# Patient Record
Sex: MALE | Race: BLACK OR AFRICAN AMERICAN | NOT HISPANIC OR LATINO | URBAN - METROPOLITAN AREA
[De-identification: names, ages, dates, MRNs, and addresses within clinical notes are randomized per-mention and may not be internally consistent; named-entity substitution may affect disease eponyms.]

---

## 2023-11-06 ENCOUNTER — EMERGENCY (EMERGENCY)
Facility: HOSPITAL | Age: 28
LOS: 0 days | Discharge: HOME | End: 2023-11-06
Payer: MEDICAID

## 2023-11-06 VITALS
HEIGHT: 69 IN | WEIGHT: 139.99 LBS | OXYGEN SATURATION: 96 % | RESPIRATION RATE: 16 BRPM | TEMPERATURE: 98 F | HEART RATE: 94 BPM | DIASTOLIC BLOOD PRESSURE: 90 MMHG | SYSTOLIC BLOOD PRESSURE: 127 MMHG

## 2023-11-06 VITALS
DIASTOLIC BLOOD PRESSURE: 98 MMHG | OXYGEN SATURATION: 98 % | RESPIRATION RATE: 17 BRPM | HEART RATE: 84 BPM | SYSTOLIC BLOOD PRESSURE: 131 MMHG
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
## 2023-11-06 DIAGNOSIS — M54.50 LOW BACK PAIN, UNSPECIFIED: ICD-10-CM
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
## 2023-11-06 DIAGNOSIS — M54.9 DORSALGIA, UNSPECIFIED: ICD-10-CM

## 2023-11-06 PROCEDURE — 99284 EMERGENCY DEPT VISIT MOD MDM: CPT

## 2023-11-06 RX ORDER — IBUPROFEN 200 MG
1 TABLET ORAL
Qty: 20 | Refills: 0
Start: 2023-11-06 | End: 2023-11-10

## 2023-11-06 RX ORDER — METHOCARBAMOL 500 MG/1
2 TABLET, FILM COATED ORAL
Qty: 30 | Refills: 0
Start: 2023-11-06 | End: 2023-11-10

## 2023-11-06 NOTE — ED PROVIDER NOTE - CARE PROVIDER_API CALL
your pmd in 1-3 days,   Phone: (   )    -  Fax: (   )    -  Follow Up Time:     Ann Penn  Orthopaedic Surgery  [facility], [address] [address]  Phone: [phone]  Fax: [phone]  Follow Up Time: 1-3 Days

## 2023-11-06 NOTE — ED PROVIDER NOTE - PROVIDER TOKENS
FREE:[LAST:[your pmd in 1-3 days],PHONE:[(   )    -],FAX:[(   )    -]],PROVIDER:[TOKEN:[[id number]],FOLLOWUP:[1-3 Days]]

## 2023-11-06 NOTE — ED ADULT TRIAGE NOTE - CHIEF COMPLAINT QUOTE
Pt AAO x 3 ambulatory with steady gait c/o lower back pain x 3 days ago denies injury or trauma or urinary changes. pt states he was in bed and twisted and since then he has pain

## 2023-11-06 NOTE — ED PROVIDER NOTE - CLINICAL SUMMARY MEDICAL DECISION MAKING FREE TEXT BOX
+backpain, no red flags.   mild now, does not want pain medication now.   Reviewed necessity for follow up. Counseled on red flags and to return for them.  Patient appears well on discharge.

## 2023-11-06 NOTE — ED PROVIDER NOTE - PHYSICAL EXAMINATION
PHYSICAL EXAM:    GENERAL: Alert, appears stated age, well appearing, non-toxic  SKIN: Warm, and dry. MMM.   HEAD: NC, AT  EYE: Normal lids/conjunctiva  ENT: Normal hearing, patent oropharynx   NECK: +supple. No meningismus, or JVD,  Pulm: Bilateral BS, normal resp effort, no wheezes, stridor, or retractions  CV: RRR, no M/R/G, 2+and = radial pulses  Abd: soft, non-tender, non-distended, no rebound/guarding. no CVA tenderness.   Mskel: no erythema, cyanosis, edema. no calf tenderness. no spinal ttp. +R paralumbar ttp. 2+ dp/pt pulses. no rash.   Neuro: AAOx3,normal gait.

## 2023-11-06 NOTE — ED PROVIDER NOTE - PATIENT PORTAL LINK FT
You can access the FollowMyHealth Patient Portal offered by [facility] by registering at the following website: [email or web address]. By joining [facility]’s FollowMyHealth portal, you will also be able to view your health information using other applications (apps) compatible with our system.

## 2023-11-06 NOTE — ED ADULT NURSE NOTE - NSFALLUNIVINTERV_ED_ALL_ED
Bed/Stretcher in lowest position, wheels locked, appropriate side rails in place/Call bell, personal items and telephone in reach/Instruct patient to call for assistance before getting out of bed/chair/stretcher/Non-slip footwear applied when patient is off stretcher/[place] to call system/Physically safe environment - no spills, clutter or unnecessary equipment/Purposeful proactive rounding/Room/bathroom lighting operational, light cord in reach

## 2023-11-06 NOTE — ED PROVIDER NOTE - OBJECTIVE STATEMENT
28-year-old male without PMH presents with moderate constant throbbing nonradiating right back pain s/p sudden abrupt twisting motion 3 days PTA.  + Much worse with standing twisting torso, movement: Much better in certain positions.  Did take Tylenol with some benefit.  No fever, nausea, vomiting, abdominal pain, chest pain, shortness of breath, urinary symptoms, testicular pain, pain anywhere else.   denies saddle anesthesia, bowel/bladder dysfunction, difficulty ambulating, paraesthesias, direct trauma, hx IVDA, recent injections, weakness, hx back surgeries, unexplained wt loss.

## 2025-05-12 NOTE — ED PROVIDER NOTE - INTERNATIONAL TRAVEL
----- Message from Celena Doshi sent at 5/12/2025 10:30 AM CDT -----  Results reviewed.   Moderate arthritic changes noted. See ortho as discussed  ----- Message -----  From: Lori Gonzalez In  Sent: 5/12/2025  10:13 AM CDT  To: YOANA Ayala    
No